# Patient Record
Sex: FEMALE | Race: ASIAN | NOT HISPANIC OR LATINO
[De-identification: names, ages, dates, MRNs, and addresses within clinical notes are randomized per-mention and may not be internally consistent; named-entity substitution may affect disease eponyms.]

---

## 2018-07-24 ENCOUNTER — APPOINTMENT (OUTPATIENT)
Dept: ULTRASOUND IMAGING | Facility: CLINIC | Age: 48
End: 2018-07-24

## 2018-07-24 ENCOUNTER — APPOINTMENT (OUTPATIENT)
Dept: MAMMOGRAPHY | Facility: CLINIC | Age: 48
End: 2018-07-24

## 2018-12-17 ENCOUNTER — APPOINTMENT (OUTPATIENT)
Dept: MRI IMAGING | Facility: IMAGING CENTER | Age: 48
End: 2018-12-17
Payer: COMMERCIAL

## 2018-12-17 ENCOUNTER — OUTPATIENT (OUTPATIENT)
Dept: OUTPATIENT SERVICES | Facility: HOSPITAL | Age: 48
LOS: 1 days | End: 2018-12-17
Payer: COMMERCIAL

## 2018-12-17 ENCOUNTER — EMERGENCY (EMERGENCY)
Facility: HOSPITAL | Age: 48
LOS: 1 days | Discharge: HOME | End: 2018-12-17
Attending: STUDENT IN AN ORGANIZED HEALTH CARE EDUCATION/TRAINING PROGRAM
Payer: COMMERCIAL

## 2018-12-17 VITALS
TEMPERATURE: 99 F | HEIGHT: 62 IN | DIASTOLIC BLOOD PRESSURE: 82 MMHG | SYSTOLIC BLOOD PRESSURE: 142 MMHG | OXYGEN SATURATION: 100 % | WEIGHT: 147.93 LBS | HEART RATE: 96 BPM | RESPIRATION RATE: 20 BRPM

## 2018-12-17 DIAGNOSIS — Z00.8 ENCOUNTER FOR OTHER GENERAL EXAMINATION: ICD-10-CM

## 2018-12-17 PROCEDURE — 99284 EMERGENCY DEPT VISIT MOD MDM: CPT | Mod: 25

## 2018-12-17 PROCEDURE — 70553 MRI BRAIN STEM W/O & W/DYE: CPT

## 2018-12-17 PROCEDURE — 82565 ASSAY OF CREATININE: CPT

## 2018-12-17 PROCEDURE — 70553 MRI BRAIN STEM W/O & W/DYE: CPT | Mod: 26

## 2018-12-17 PROCEDURE — A9585: CPT

## 2018-12-18 VITALS
SYSTOLIC BLOOD PRESSURE: 100 MMHG | RESPIRATION RATE: 18 BRPM | HEART RATE: 99 BPM | OXYGEN SATURATION: 100 % | DIASTOLIC BLOOD PRESSURE: 66 MMHG | TEMPERATURE: 100 F

## 2018-12-18 LAB
ANION GAP SERPL CALC-SCNC: 15 MMOL/L — SIGNIFICANT CHANGE UP (ref 5–17)
BASOPHILS # BLD AUTO: 0 K/UL — SIGNIFICANT CHANGE UP (ref 0–0.2)
BASOPHILS NFR BLD AUTO: 0 % — SIGNIFICANT CHANGE UP (ref 0–2)
BUN SERPL-MCNC: 7 MG/DL — SIGNIFICANT CHANGE UP (ref 7–23)
CALCIUM SERPL-MCNC: 9.1 MG/DL — SIGNIFICANT CHANGE UP (ref 8.4–10.5)
CHLORIDE SERPL-SCNC: 102 MMOL/L — SIGNIFICANT CHANGE UP (ref 96–108)
CO2 SERPL-SCNC: 22 MMOL/L — SIGNIFICANT CHANGE UP (ref 22–31)
CREAT SERPL-MCNC: 0.58 MG/DL — SIGNIFICANT CHANGE UP (ref 0.5–1.3)
EOSINOPHIL # BLD AUTO: 0.1 K/UL — SIGNIFICANT CHANGE UP (ref 0–0.5)
EOSINOPHIL NFR BLD AUTO: 0.9 % — SIGNIFICANT CHANGE UP (ref 0–6)
FLUAV H3 RNA SPEC QL NAA+PROBE: DETECTED
GAS PNL BLDV: SIGNIFICANT CHANGE UP
GLUCOSE SERPL-MCNC: 111 MG/DL — HIGH (ref 70–99)
HCT VFR BLD CALC: 36.1 % — SIGNIFICANT CHANGE UP (ref 34.5–45)
HGB BLD-MCNC: 12.7 G/DL — SIGNIFICANT CHANGE UP (ref 11.5–15.5)
LYMPHOCYTES # BLD AUTO: 0.6 K/UL — LOW (ref 1–3.3)
LYMPHOCYTES # BLD AUTO: 9 % — LOW (ref 13–44)
MAGNESIUM SERPL-MCNC: 1.6 MG/DL — SIGNIFICANT CHANGE UP (ref 1.6–2.6)
MCHC RBC-ENTMCNC: 30.4 PG — SIGNIFICANT CHANGE UP (ref 27–34)
MCHC RBC-ENTMCNC: 35.1 GM/DL — SIGNIFICANT CHANGE UP (ref 32–36)
MCV RBC AUTO: 86.6 FL — SIGNIFICANT CHANGE UP (ref 80–100)
MONOCYTES # BLD AUTO: 0.5 K/UL — SIGNIFICANT CHANGE UP (ref 0–0.9)
MONOCYTES NFR BLD AUTO: 8.5 % — SIGNIFICANT CHANGE UP (ref 2–14)
NEUTROPHILS # BLD AUTO: 5 K/UL — SIGNIFICANT CHANGE UP (ref 1.8–7.4)
NEUTROPHILS NFR BLD AUTO: 81.6 % — HIGH (ref 43–77)
PHOSPHATE SERPL-MCNC: 2.5 MG/DL — SIGNIFICANT CHANGE UP (ref 2.5–4.5)
PLATELET # BLD AUTO: 205 K/UL — SIGNIFICANT CHANGE UP (ref 150–400)
POTASSIUM SERPL-MCNC: 3.7 MMOL/L — SIGNIFICANT CHANGE UP (ref 3.5–5.3)
POTASSIUM SERPL-SCNC: 3.7 MMOL/L — SIGNIFICANT CHANGE UP (ref 3.5–5.3)
RAPID RVP RESULT: DETECTED
RBC # BLD: 4.17 M/UL — SIGNIFICANT CHANGE UP (ref 3.8–5.2)
RBC # FLD: 11.6 % — SIGNIFICANT CHANGE UP (ref 10.3–14.5)
SODIUM SERPL-SCNC: 139 MMOL/L — SIGNIFICANT CHANGE UP (ref 135–145)
WBC # BLD: 6.1 K/UL — SIGNIFICANT CHANGE UP (ref 3.8–10.5)
WBC # FLD AUTO: 6.1 K/UL — SIGNIFICANT CHANGE UP (ref 3.8–10.5)

## 2018-12-18 PROCEDURE — 85014 HEMATOCRIT: CPT

## 2018-12-18 PROCEDURE — 83735 ASSAY OF MAGNESIUM: CPT

## 2018-12-18 PROCEDURE — 99284 EMERGENCY DEPT VISIT MOD MDM: CPT | Mod: 25

## 2018-12-18 PROCEDURE — 80048 BASIC METABOLIC PNL TOTAL CA: CPT

## 2018-12-18 PROCEDURE — 87040 BLOOD CULTURE FOR BACTERIA: CPT

## 2018-12-18 PROCEDURE — 84100 ASSAY OF PHOSPHORUS: CPT

## 2018-12-18 PROCEDURE — 87581 M.PNEUMON DNA AMP PROBE: CPT

## 2018-12-18 PROCEDURE — 84295 ASSAY OF SERUM SODIUM: CPT

## 2018-12-18 PROCEDURE — 87798 DETECT AGENT NOS DNA AMP: CPT

## 2018-12-18 PROCEDURE — 87486 CHLMYD PNEUM DNA AMP PROBE: CPT

## 2018-12-18 PROCEDURE — 85027 COMPLETE CBC AUTOMATED: CPT

## 2018-12-18 PROCEDURE — 87633 RESP VIRUS 12-25 TARGETS: CPT

## 2018-12-18 PROCEDURE — 84132 ASSAY OF SERUM POTASSIUM: CPT

## 2018-12-18 PROCEDURE — 82803 BLOOD GASES ANY COMBINATION: CPT

## 2018-12-18 PROCEDURE — 71045 X-RAY EXAM CHEST 1 VIEW: CPT | Mod: 26

## 2018-12-18 PROCEDURE — 71045 X-RAY EXAM CHEST 1 VIEW: CPT

## 2018-12-18 PROCEDURE — 83605 ASSAY OF LACTIC ACID: CPT

## 2018-12-18 PROCEDURE — 82330 ASSAY OF CALCIUM: CPT

## 2018-12-18 PROCEDURE — 82947 ASSAY GLUCOSE BLOOD QUANT: CPT

## 2018-12-18 PROCEDURE — 82435 ASSAY OF BLOOD CHLORIDE: CPT

## 2018-12-18 RX ORDER — SODIUM CHLORIDE 9 MG/ML
1000 INJECTION INTRAMUSCULAR; INTRAVENOUS; SUBCUTANEOUS ONCE
Qty: 0 | Refills: 0 | Status: COMPLETED | OUTPATIENT
Start: 2018-12-18 | End: 2018-12-18

## 2018-12-18 RX ORDER — ACETAMINOPHEN 500 MG
650 TABLET ORAL ONCE
Qty: 0 | Refills: 0 | Status: COMPLETED | OUTPATIENT
Start: 2018-12-18 | End: 2018-12-18

## 2018-12-18 RX ADMIN — Medication 100 MILLIGRAM(S): at 03:12

## 2018-12-18 RX ADMIN — Medication 650 MILLIGRAM(S): at 03:12

## 2018-12-18 RX ADMIN — SODIUM CHLORIDE 1000 MILLILITER(S): 9 INJECTION INTRAMUSCULAR; INTRAVENOUS; SUBCUTANEOUS at 03:13

## 2018-12-18 NOTE — ED PROVIDER NOTE - PHYSICAL EXAMINATION
PHYSICAL EXAM:    GENERAL: Comfortable, no acute distress   HEAD:  Normocephalic, atraumatic  EYES: EOMI, PERRLA  HEENT: Moist mucous membranes  NECK: Supple, No JVD  NERVOUS SYSTEM:  Alert & Oriented X3, Motor Strength 5/5 B/L upper and lower extremities  CHEST/LUNG: decrease breath sounds bilaterally, no rales or rhonchi   HEART: tachycardic rate and regular rhythm, no murmur   ABDOMEN: Soft, Nontender, Nondistended, Bowel sounds present  EXTREMITIES:   No clubbing, cyanosis, or edema  MUSCULOSKELETAL: No muscle tenderness, no joint tenderness  SKIN:  warm and dry, no rash. blisters on upper back

## 2018-12-18 NOTE — ED ADULT NURSE NOTE - PSH
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
## 2018-12-18 NOTE — ED ADULT NURSE NOTE - OBJECTIVE STATEMENT
pt c/o "non-prod cough, fever 102, general body aches/chills x 2 days. Last took motrin 400mg at 1900. Felt this way aprox 2 weeks ago and lasted 5 days and now started but again"

## 2018-12-18 NOTE — ED PROVIDER NOTE - CHIEF COMPLAINT
The patient is a 48y Female complaining of The patient is a 48y Female complaining of fevers chills cough

## 2018-12-18 NOTE — ED PROVIDER NOTE - OBJECTIVE STATEMENT
49 yo woman pmh htn, hld presenting with 10 days of fevers, chills, cough. Pt reported ha 47 yo woman pmh htn, hld presenting with 10 days of fevers, chills, cough. Pt reported having cold like symptoms of cough, running nose for 10 days. Pt started feeling better until yesterday when she had fevers and chills. Fever measured 102. 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 Pt recently traveled to [place] last september. Pt initially had dry cough, but now has productive cough with brown sputum. Denies hemoptysis. Pt also endorses rhinorrhea, headache, joint aches (hips, small joints of hands and feet). Pt reports having flu shot. No abd pain, no diarrhea, no constipation, no dysuria. Of note, pt reported using heating pad for upper back and developed blisters.

## 2018-12-18 NOTE — ED ADULT NURSE NOTE - NSIMPLEMENTINTERV_GEN_ALL_ED
Implemented All Universal Safety Interventions:  Central to call system. Call bell, personal items and telephone within reach. Instruct patient to call for assistance. Room bathroom lighting operational. Non-slip footwear when patient is off stretcher. Physically safe environment: no spills, clutter or unnecessary equipment. Stretcher in lowest position, wheels locked, appropriate side rails in place.

## 2018-12-18 NOTE — ED PROVIDER NOTE - NSFOLLOWUPINSTRUCTIONS_ED_ALL_ED_FT
You have the flu. Please maintain good hand hygiene. Avoid contact with infants and the elderly. Please stay well hydrated and rest. You can take tamiflu as directed and take cough medicine as needed. You can take tylenol for fevers and aches. If you have shortness of breath, chest pain, or fevers lasting more than a week, please return to the ED.

## 2018-12-18 NOTE — ED PROVIDER NOTE - PROGRESS NOTE DETAILS
flu positive, cxr neg, no leukocytosis. Pt feels well. Pt feels ok for dc with cough meds and tamiflu

## 2018-12-18 NOTE — ED ADULT NURSE NOTE - CHPI ED NUR SYMPTOMS NEG
no abdominal pain/no diarrhea/no decreased eating/drinking/no headache/no vomiting/no rash/no shortness of breath

## 2018-12-18 NOTE — ED ADULT NURSE NOTE - PMH
GERD (Gastroesophageal Reflux Disease)    Hypothyroid    Miscarriage  X 3   (Normal Spontaneous Vaginal Delivery)    Placenta Previa  diagnosed  placed on bedrest, resolved

## 2018-12-23 LAB
CULTURE RESULTS: SIGNIFICANT CHANGE UP
CULTURE RESULTS: SIGNIFICANT CHANGE UP
SPECIMEN SOURCE: SIGNIFICANT CHANGE UP
SPECIMEN SOURCE: SIGNIFICANT CHANGE UP

## 2019-10-14 ENCOUNTER — EMERGENCY (EMERGENCY)
Facility: HOSPITAL | Age: 49
LOS: 1 days | Discharge: HOME | End: 2019-10-14
Attending: EMERGENCY MEDICINE
Payer: COMMERCIAL

## 2019-10-14 VITALS
TEMPERATURE: 98 F | SYSTOLIC BLOOD PRESSURE: 142 MMHG | DIASTOLIC BLOOD PRESSURE: 70 MMHG | HEART RATE: 70 BPM | OXYGEN SATURATION: 99 %

## 2019-10-14 VITALS
HEIGHT: 61 IN | RESPIRATION RATE: 16 BRPM | WEIGHT: 147.93 LBS | SYSTOLIC BLOOD PRESSURE: 146 MMHG | TEMPERATURE: 98 F | HEART RATE: 73 BPM | DIASTOLIC BLOOD PRESSURE: 72 MMHG | OXYGEN SATURATION: 100 %

## 2019-10-14 PROCEDURE — 73562 X-RAY EXAM OF KNEE 3: CPT | Mod: 26,LT

## 2019-10-14 PROCEDURE — 99284 EMERGENCY DEPT VISIT MOD MDM: CPT

## 2019-10-14 PROCEDURE — 99283 EMERGENCY DEPT VISIT LOW MDM: CPT

## 2019-10-14 PROCEDURE — 73562 X-RAY EXAM OF KNEE 3: CPT

## 2019-10-14 RX ORDER — ACETAMINOPHEN 500 MG
650 TABLET ORAL ONCE
Refills: 0 | Status: COMPLETED | OUTPATIENT
Start: 2019-10-14 | End: 2019-10-14

## 2019-10-14 RX ADMIN — Medication 650 MILLIGRAM(S): at 13:07

## 2019-10-14 RX ADMIN — Medication 650 MILLIGRAM(S): at 13:13

## 2019-10-14 NOTE — ED PROVIDER NOTE - ATTENDING CONTRIBUTION TO CARE
I performed a history and physical exam of the patient and discussed their management with the resident. I reviewed the resident's note and agree with the documented findings and plan of care.  Alexia Rossi MD

## 2019-10-14 NOTE — ED PROVIDER NOTE - PSH
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  Given the location of the defect, shape of the defect and the proximity to free margins a split thickness skin graft was deemed most appropriate.  Using a sterile surgical marker, the primary defect shape was transferred to the donor site. The split thickness graft was then harvested.  The skin graft was then placed in the primary defect and oriented appropriately.

## 2019-10-14 NOTE — ED PROVIDER NOTE - NS_ ATTENDINGSCRIBEDETAILS _ED_A_ED_FT
I performed a history and physical exam of the patient and discussed their management with the resident. I reviewed the scribe's note and agree with the documented findings and plan of care.  Alexia Rossi MD

## 2019-10-14 NOTE — ED PROVIDER NOTE - CARE PROVIDER_API CALL
Jayleen Kaur (MD)  Orthopaedic Surgery  [address] [place] [address], [address]  [place] [address]  Phone: [phone]  Fax: [phone]  Follow Up Time:

## 2019-10-14 NOTE — ED PROVIDER NOTE - PMH
Documentation of the ultasound findings, images, and interpretations with be available in the patient's Viewpoint report which is located in the imaging tab in chart review.  
Pt denies problems with pregnancy.  To see OB next.  Panorama-low risk, male.  
GERD (Gastroesophageal Reflux Disease)    Hypothyroid    Miscarriage  X 3   (Normal Spontaneous Vaginal Delivery)    Placenta Previa  diagnosed  placed on bedrest, resolved
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
## 2019-10-14 NOTE — ED PROVIDER NOTE - PHYSICAL EXAMINATION
General: No acute distress.  Heart: Regular rate and rhythm. No murmurs, rubs, or gallops.  Lungs: CTAB, no wheezes or rhonchi.  Abdomen: Soft, non-tender, non-distended. No organomegaly.  Eyes: PERRL, EOMI.  Neuro: AAOx4, no focal motor or sensory deficits. CN II-XII intact.  Extremities: No lower extremity swelling, 2+ DP and radial pulses.  Skin: No rashes or lesions. General: No acute distress.  Heart: Regular rate and rhythm. No murmurs, rubs, or gallops.  Lungs: CTAB, no wheezes or rhonchi.  Abdomen: Soft, non-tender, non-distended. No organomegaly.  Eyes: PERRL, EOMI.  Neuro: AAOx4, no focal motor or sensory deficits. CN II-XII intact.  Extremities: Left knee medial tenderness with decreased ROM but no swelling.  Skin: No rashes or lesions.  Back: Left-sided paraspinal lumbar tenderness, no point tenderness over vertebrae. No CVA tenderness.

## 2019-10-14 NOTE — ED PROVIDER NOTE - PATIENT PORTAL LINK FT
You can access the FollowMyHealth Patient Portal offered by [facility] by registering at the following website: [email or web address]. By joining [employer]’s FollowMyHealth portal, you will also be able to view your health information using other applications (apps) compatible with our system.
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
## 2019-10-14 NOTE — ED PROVIDER NOTE - NSFOLLOWUPCLINICS_GEN_ALL_ED_FT
[facility] Sports [facility]  Sports Medicine  [address] [place] [address]  Phone: [phone]  Fax:   Follow Up Time:
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
## 2020-12-04 ENCOUNTER — APPOINTMENT (OUTPATIENT)
Dept: ORTHOPEDIC SURGERY | Facility: CLINIC | Age: 50
End: 2020-12-04
Payer: OTHER MISCELLANEOUS

## 2020-12-04 VITALS — WEIGHT: 148 LBS | HEIGHT: 61 IN | BODY MASS INDEX: 27.94 KG/M2

## 2020-12-04 VITALS — DIASTOLIC BLOOD PRESSURE: 82 MMHG | SYSTOLIC BLOOD PRESSURE: 156 MMHG | HEART RATE: 84 BPM

## 2020-12-04 DIAGNOSIS — M54.2 CERVICALGIA: ICD-10-CM

## 2020-12-04 DIAGNOSIS — M54.16 RADICULOPATHY, LUMBAR REGION: ICD-10-CM

## 2020-12-04 DIAGNOSIS — G89.29 LUMBAGO WITH SCIATICA, RIGHT SIDE: ICD-10-CM

## 2020-12-04 DIAGNOSIS — M54.41 LUMBAGO WITH SCIATICA, RIGHT SIDE: ICD-10-CM
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
## 2020-12-04 DIAGNOSIS — Z86.39 PERSONAL HISTORY OF OTHER ENDOCRINE, NUTRITIONAL AND METABOLIC DISEASE: ICD-10-CM

## 2020-12-04 DIAGNOSIS — Z87.898 PERSONAL HISTORY OF OTHER SPECIFIED CONDITIONS: ICD-10-CM

## 2020-12-04 DIAGNOSIS — M54.12 RADICULOPATHY, CERVICAL REGION: ICD-10-CM

## 2020-12-04 PROCEDURE — 72040 X-RAY EXAM NECK SPINE 2-3 VW: CPT

## 2020-12-04 PROCEDURE — 72100 X-RAY EXAM L-S SPINE 2/3 VWS: CPT

## 2020-12-04 PROCEDURE — 99072 ADDL SUPL MATRL&STAF TM PHE: CPT

## 2020-12-04 PROCEDURE — 99204 OFFICE O/P NEW MOD 45 MIN: CPT

## 2020-12-04 RX ORDER — IBUPROFEN 800 MG/1
800 TABLET, FILM COATED ORAL
Qty: 90 | Refills: 0 | Status: ACTIVE | COMMUNITY
Start: 2020-12-04 | End: 1900-01-01
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
## 2020-12-04 NOTE — PHYSICAL EXAM
[de-identified] : She is fully alert and oriented with a normal mood and affect.  She is in no acute distress as I take the history.  She ambulates with a normal gait including tiptoe and heel walking.  Cutaneous examination of the spine reveals some midline blistering that she says is due to a heating pad at the lumbosacral junction.  There is no evidence of shortness of breath or respiratory distress.  There are no other cutaneous abnormalities or blisters that would suggest that this could be shingles.  There is no paravertebral muscle spasm, sciatic notch tenderness or trochanteric tenderness.  Forward flexion of the spine to 60 degrees causes lower back pain.  Her lower extremity neurological examination revealed 1-2+ symmetrical reflexes with downgoing toes.  Motor power was normal to manual testing in all lower extremity groups but there was some giving way weakness of the right quadriceps that required coaxing her to cooperate.  There is decreased sensation to light touch on the right side in an L5 and S1 nerve root distribution.  Straight leg raising was negative to 90 degrees on the left in the sitting position and on the right at 90 degrees because of lower back discomfort.  Vascular examination showed no evidence of varicosities and there is no lymphedema.  There are no cutaneous abnormalities of the upper or lower extremities.  There was some mild 5 - to 4+/5 weakness in a diffuse fashion in the right upper extremity.  This may have been due to pain.  Reflexes were trace in the upper extremities with reinforcement.  Range of motion of the cervical spine is restricted by pain. [de-identified] : Is no imaging studies or reports are available AP and lateral x-rays of the cervical and lumbar spine were obtained.  Lumbar spine x-rays reveal normal sagittal alignment.  There are no destructive changes.  Disc heights are well-maintained.  There are some changes of facet arthrosis.  There is a minimal scoliosis.  AP and lateral x-rays of the cervical spine reveal straightening of the normal cervical lordosis with some very minimal disc space narrowing at C5-6.  There are no destructive changes.

## 2020-12-04 NOTE — CONSULT LETTER
[Dear  ___] : Dear  [unfilled], [Please see my note below.] : Please see my note below. [Sincerely,] : Sincerely, [FreeTextEntry1] : Thank you for referring this woman for evaluation of her ongoing spine related symptoms.

## 2020-12-04 NOTE — DISCUSSION/SUMMARY
[Medication Risks Reviewed] : Medication risks reviewed [de-identified] : She will rest and use moist heat.  She has been started on ibuprofen 800 mg 3 times a day as a nonsteroidal anti-inflammatory.  She will be seeing her neurologist early next week.  She may need another MRI of the lumbar spine and they are currently awaiting approval for another MRI of the cervical spine.  She will call if there are problems with the medication or worsening of her symptoms and I will see her for follow-up in 3 weeks.

## 2020-12-04 NOTE — REASON FOR VISIT
[Initial Visit] : an initial visit for [Worker's Compensation] : this visit is related to worker's compensation [Back Pain] : back pain [Neck Pain] : neck pain [Radiculopathy] : radiculopathy

## 2020-12-10 ENCOUNTER — APPOINTMENT (OUTPATIENT)
Dept: PLASTIC SURGERY | Facility: CLINIC | Age: 50
End: 2020-12-10
Payer: SELF-PAY

## 2020-12-10 VITALS — HEIGHT: 61 IN | WEIGHT: 148 LBS | BODY MASS INDEX: 27.94 KG/M2

## 2020-12-10 PROCEDURE — 99203 OFFICE O/P NEW LOW 30 MIN: CPT

## 2020-12-10 PROCEDURE — 99499Q: CUSTOM | Mod: NC

## 2020-12-13 NOTE — HISTORY OF PRESENT ILLNESS
[FreeTextEntry1] : 50 years old Patient presents to the office for forehead wrinkles and nasolabial fold area.\zainab Pt never had any injection or facial surgery.\zainab Marshall is here to discuss w/MD.

## 2020-12-22 ENCOUNTER — APPOINTMENT (OUTPATIENT)
Dept: PLASTIC SURGERY | Facility: CLINIC | Age: 50
End: 2020-12-22
Payer: SELF-PAY

## 2020-12-22 PROCEDURE — G0429: CPT

## 2020-12-28 ENCOUNTER — APPOINTMENT (OUTPATIENT)
Dept: ORTHOPEDIC SURGERY | Facility: CLINIC | Age: 50
End: 2020-12-28

## 2021-01-05 ENCOUNTER — APPOINTMENT (OUTPATIENT)
Dept: PLASTIC SURGERY | Facility: CLINIC | Age: 51
End: 2021-01-05
Payer: SELF-PAY

## 2021-01-05 DIAGNOSIS — Z09 ENCOUNTER FOR FOLLOW-UP EXAMINATION AFTER COMPLETED TREATMENT FOR CONDITIONS OTHER THAN MALIGNANT NEOPLASM: ICD-10-CM

## 2021-01-05 PROCEDURE — 99499Q: CUSTOM | Mod: NC

## 2021-01-05 NOTE — HISTORY OF PRESENT ILLNESS
[FreeTextEntry1] : Patient is being seen for follow up Botox injection and Fillers on 12/22/20.  Devi is pleased with her results but \par reports burning sensation and discomfort in her left cheek. She states the onset began 2-3 days after the injection and it has gotten better with time but still persists.  She denies any redness, wounds, or skin discoloration.\par

## 2021-01-05 NOTE — PHYSICAL EXAM
[de-identified] : alert, calm, cooperative\par  [de-identified] : respirations are even and unlabored\par  [de-identified] : the Botox has settled in well.  Brows are symmetric, forehead smooth.  Bilateral nasolabial folds are with mild edema.  Small area of palpable filler appreciated at the left nasolabial fold. No signs of skin necrosis.  Capillary refill is good, skin is pink.

## 2022-02-24 ENCOUNTER — APPOINTMENT (OUTPATIENT)
Dept: PLASTIC SURGERY | Facility: CLINIC | Age: 52
End: 2022-02-24
Payer: SELF-PAY

## 2022-02-24 NOTE — HISTORY OF PRESENT ILLNESS
[FreeTextEntry1] : 51-year-old female with facial aging presents for Botox injections. Patient reports being happy with the results from last treatment with Botox.

## 2022-02-24 NOTE — PHYSICAL EXAM
[de-identified] : Well-appearing, NAD. [de-identified] : Normocephalic, atraumatic. [de-identified] : C/D/I.

## 2022-07-28 ENCOUNTER — APPOINTMENT (OUTPATIENT)
Dept: PLASTIC SURGERY | Facility: CLINIC | Age: 52
End: 2022-07-28

## 2022-12-13 ENCOUNTER — APPOINTMENT (OUTPATIENT)
Dept: PLASTIC SURGERY | Facility: CLINIC | Age: 52
End: 2022-12-13

## 2023-05-18 ENCOUNTER — APPOINTMENT (OUTPATIENT)
Dept: PLASTIC SURGERY | Facility: CLINIC | Age: 53
End: 2023-05-18
Payer: SELF-PAY

## 2023-10-12 ENCOUNTER — APPOINTMENT (OUTPATIENT)
Dept: PLASTIC SURGERY | Facility: CLINIC | Age: 53
End: 2023-10-12

## 2023-10-19 ENCOUNTER — APPOINTMENT (OUTPATIENT)
Dept: PLASTIC SURGERY | Facility: CLINIC | Age: 53
End: 2023-10-19
Payer: SELF-PAY

## 2023-11-30 ENCOUNTER — INPATIENT (INPATIENT)
Facility: HOSPITAL | Age: 53
LOS: 0 days | Discharge: HOME | End: 2023-12-01
Attending: INTERNAL MEDICINE | Admitting: INTERNAL MEDICINE
Payer: COMMERCIAL

## 2023-11-30 VITALS
HEART RATE: 72 BPM | DIASTOLIC BLOOD PRESSURE: 106 MMHG | RESPIRATION RATE: 17 BRPM | OXYGEN SATURATION: 100 % | TEMPERATURE: 98 F | SYSTOLIC BLOOD PRESSURE: 215 MMHG

## 2023-11-30 DIAGNOSIS — R07.9 CHEST PAIN, UNSPECIFIED: ICD-10-CM

## 2023-11-30 LAB
ALBUMIN SERPL ELPH-MCNC: 4.5 G/DL — SIGNIFICANT CHANGE UP (ref 3.3–5)
ALBUMIN SERPL ELPH-MCNC: 4.5 G/DL — SIGNIFICANT CHANGE UP (ref 3.3–5)
ALP SERPL-CCNC: 64 U/L — SIGNIFICANT CHANGE UP (ref 40–120)
ALP SERPL-CCNC: 64 U/L — SIGNIFICANT CHANGE UP (ref 40–120)
ALT FLD-CCNC: 14 U/L — SIGNIFICANT CHANGE UP (ref 4–33)
ALT FLD-CCNC: 14 U/L — SIGNIFICANT CHANGE UP (ref 4–33)
ANION GAP SERPL CALC-SCNC: 7 MMOL/L — SIGNIFICANT CHANGE UP (ref 7–14)
ANION GAP SERPL CALC-SCNC: 7 MMOL/L — SIGNIFICANT CHANGE UP (ref 7–14)
AST SERPL-CCNC: 12 U/L — SIGNIFICANT CHANGE UP (ref 4–32)
AST SERPL-CCNC: 12 U/L — SIGNIFICANT CHANGE UP (ref 4–32)
BASOPHILS # BLD AUTO: 0.02 K/UL — SIGNIFICANT CHANGE UP (ref 0–0.2)
BASOPHILS # BLD AUTO: 0.02 K/UL — SIGNIFICANT CHANGE UP (ref 0–0.2)
BASOPHILS NFR BLD AUTO: 0.4 % — SIGNIFICANT CHANGE UP (ref 0–2)
BASOPHILS NFR BLD AUTO: 0.4 % — SIGNIFICANT CHANGE UP (ref 0–2)
BILIRUB SERPL-MCNC: 0.6 MG/DL — SIGNIFICANT CHANGE UP (ref 0.2–1.2)
BILIRUB SERPL-MCNC: 0.6 MG/DL — SIGNIFICANT CHANGE UP (ref 0.2–1.2)
BUN SERPL-MCNC: 15 MG/DL — SIGNIFICANT CHANGE UP (ref 7–23)
BUN SERPL-MCNC: 15 MG/DL — SIGNIFICANT CHANGE UP (ref 7–23)
CALCIUM SERPL-MCNC: 9.7 MG/DL — SIGNIFICANT CHANGE UP (ref 8.4–10.5)
CALCIUM SERPL-MCNC: 9.7 MG/DL — SIGNIFICANT CHANGE UP (ref 8.4–10.5)
CHLORIDE SERPL-SCNC: 105 MMOL/L — SIGNIFICANT CHANGE UP (ref 98–107)
CHLORIDE SERPL-SCNC: 105 MMOL/L — SIGNIFICANT CHANGE UP (ref 98–107)
CO2 SERPL-SCNC: 28 MMOL/L — SIGNIFICANT CHANGE UP (ref 22–31)
CO2 SERPL-SCNC: 28 MMOL/L — SIGNIFICANT CHANGE UP (ref 22–31)
CREAT SERPL-MCNC: 0.6 MG/DL — SIGNIFICANT CHANGE UP (ref 0.5–1.3)
CREAT SERPL-MCNC: 0.6 MG/DL — SIGNIFICANT CHANGE UP (ref 0.5–1.3)
EGFR: 107 ML/MIN/1.73M2 — SIGNIFICANT CHANGE UP
EGFR: 107 ML/MIN/1.73M2 — SIGNIFICANT CHANGE UP
EOSINOPHIL # BLD AUTO: 0.05 K/UL — SIGNIFICANT CHANGE UP (ref 0–0.5)
EOSINOPHIL # BLD AUTO: 0.05 K/UL — SIGNIFICANT CHANGE UP (ref 0–0.5)
EOSINOPHIL NFR BLD AUTO: 1 % — SIGNIFICANT CHANGE UP (ref 0–6)
EOSINOPHIL NFR BLD AUTO: 1 % — SIGNIFICANT CHANGE UP (ref 0–6)
GLUCOSE SERPL-MCNC: 106 MG/DL — HIGH (ref 70–99)
GLUCOSE SERPL-MCNC: 106 MG/DL — HIGH (ref 70–99)
HCT VFR BLD CALC: 40 % — SIGNIFICANT CHANGE UP (ref 34.5–45)
HCT VFR BLD CALC: 40 % — SIGNIFICANT CHANGE UP (ref 34.5–45)
HGB BLD-MCNC: 13.6 G/DL — SIGNIFICANT CHANGE UP (ref 11.5–15.5)
HGB BLD-MCNC: 13.6 G/DL — SIGNIFICANT CHANGE UP (ref 11.5–15.5)
IANC: 2.64 K/UL — SIGNIFICANT CHANGE UP (ref 1.8–7.4)
IANC: 2.64 K/UL — SIGNIFICANT CHANGE UP (ref 1.8–7.4)
IMM GRANULOCYTES NFR BLD AUTO: 0.2 % — SIGNIFICANT CHANGE UP (ref 0–0.9)
IMM GRANULOCYTES NFR BLD AUTO: 0.2 % — SIGNIFICANT CHANGE UP (ref 0–0.9)
LYMPHOCYTES # BLD AUTO: 1.81 K/UL — SIGNIFICANT CHANGE UP (ref 1–3.3)
LYMPHOCYTES # BLD AUTO: 1.81 K/UL — SIGNIFICANT CHANGE UP (ref 1–3.3)
LYMPHOCYTES # BLD AUTO: 36.6 % — SIGNIFICANT CHANGE UP (ref 13–44)
LYMPHOCYTES # BLD AUTO: 36.6 % — SIGNIFICANT CHANGE UP (ref 13–44)
MCHC RBC-ENTMCNC: 28.8 PG — SIGNIFICANT CHANGE UP (ref 27–34)
MCHC RBC-ENTMCNC: 28.8 PG — SIGNIFICANT CHANGE UP (ref 27–34)
MCHC RBC-ENTMCNC: 34 GM/DL — SIGNIFICANT CHANGE UP (ref 32–36)
MCHC RBC-ENTMCNC: 34 GM/DL — SIGNIFICANT CHANGE UP (ref 32–36)
MCV RBC AUTO: 84.7 FL — SIGNIFICANT CHANGE UP (ref 80–100)
MCV RBC AUTO: 84.7 FL — SIGNIFICANT CHANGE UP (ref 80–100)
MONOCYTES # BLD AUTO: 0.42 K/UL — SIGNIFICANT CHANGE UP (ref 0–0.9)
MONOCYTES # BLD AUTO: 0.42 K/UL — SIGNIFICANT CHANGE UP (ref 0–0.9)
MONOCYTES NFR BLD AUTO: 8.5 % — SIGNIFICANT CHANGE UP (ref 2–14)
MONOCYTES NFR BLD AUTO: 8.5 % — SIGNIFICANT CHANGE UP (ref 2–14)
NEUTROPHILS # BLD AUTO: 2.64 K/UL — SIGNIFICANT CHANGE UP (ref 1.8–7.4)
NEUTROPHILS # BLD AUTO: 2.64 K/UL — SIGNIFICANT CHANGE UP (ref 1.8–7.4)
NEUTROPHILS NFR BLD AUTO: 53.3 % — SIGNIFICANT CHANGE UP (ref 43–77)
NEUTROPHILS NFR BLD AUTO: 53.3 % — SIGNIFICANT CHANGE UP (ref 43–77)
NRBC # BLD: 0 /100 WBCS — SIGNIFICANT CHANGE UP (ref 0–0)
NRBC # BLD: 0 /100 WBCS — SIGNIFICANT CHANGE UP (ref 0–0)
NRBC # FLD: 0 K/UL — SIGNIFICANT CHANGE UP (ref 0–0)
NRBC # FLD: 0 K/UL — SIGNIFICANT CHANGE UP (ref 0–0)
PLATELET # BLD AUTO: 243 K/UL — SIGNIFICANT CHANGE UP (ref 150–400)
PLATELET # BLD AUTO: 243 K/UL — SIGNIFICANT CHANGE UP (ref 150–400)
POTASSIUM SERPL-MCNC: 4.4 MMOL/L — SIGNIFICANT CHANGE UP (ref 3.5–5.3)
POTASSIUM SERPL-MCNC: 4.4 MMOL/L — SIGNIFICANT CHANGE UP (ref 3.5–5.3)
POTASSIUM SERPL-SCNC: 4.4 MMOL/L — SIGNIFICANT CHANGE UP (ref 3.5–5.3)
POTASSIUM SERPL-SCNC: 4.4 MMOL/L — SIGNIFICANT CHANGE UP (ref 3.5–5.3)
PROT SERPL-MCNC: 7.5 G/DL — SIGNIFICANT CHANGE UP (ref 6–8.3)
PROT SERPL-MCNC: 7.5 G/DL — SIGNIFICANT CHANGE UP (ref 6–8.3)
RBC # BLD: 4.72 M/UL — SIGNIFICANT CHANGE UP (ref 3.8–5.2)
RBC # BLD: 4.72 M/UL — SIGNIFICANT CHANGE UP (ref 3.8–5.2)
RBC # FLD: 12.5 % — SIGNIFICANT CHANGE UP (ref 10.3–14.5)
RBC # FLD: 12.5 % — SIGNIFICANT CHANGE UP (ref 10.3–14.5)
SODIUM SERPL-SCNC: 140 MMOL/L — SIGNIFICANT CHANGE UP (ref 135–145)
SODIUM SERPL-SCNC: 140 MMOL/L — SIGNIFICANT CHANGE UP (ref 135–145)
TROPONIN T, HIGH SENSITIVITY RESULT: <6 NG/L — SIGNIFICANT CHANGE UP
TSH SERPL-MCNC: 2.91 UIU/ML — SIGNIFICANT CHANGE UP (ref 0.27–4.2)
TSH SERPL-MCNC: 2.91 UIU/ML — SIGNIFICANT CHANGE UP (ref 0.27–4.2)
WBC # BLD: 4.95 K/UL — SIGNIFICANT CHANGE UP (ref 3.8–10.5)
WBC # BLD: 4.95 K/UL — SIGNIFICANT CHANGE UP (ref 3.8–10.5)
WBC # FLD AUTO: 4.95 K/UL — SIGNIFICANT CHANGE UP (ref 3.8–10.5)
WBC # FLD AUTO: 4.95 K/UL — SIGNIFICANT CHANGE UP (ref 3.8–10.5)

## 2023-11-30 PROCEDURE — 71045 X-RAY EXAM CHEST 1 VIEW: CPT | Mod: 26

## 2023-11-30 PROCEDURE — 99285 EMERGENCY DEPT VISIT HI MDM: CPT

## 2023-11-30 RX ORDER — LABETALOL HCL 100 MG
10 TABLET ORAL ONCE
Refills: 0 | Status: COMPLETED | OUTPATIENT
Start: 2023-11-30 | End: 2023-11-30

## 2023-11-30 RX ORDER — ASPIRIN/CALCIUM CARB/MAGNESIUM 324 MG
81 TABLET ORAL DAILY
Refills: 0 | Status: DISCONTINUED | OUTPATIENT
Start: 2023-11-30 | End: 2023-12-01

## 2023-11-30 RX ORDER — ATORVASTATIN CALCIUM 80 MG/1
20 TABLET, FILM COATED ORAL AT BEDTIME
Refills: 0 | Status: DISCONTINUED | OUTPATIENT
Start: 2023-11-30 | End: 2023-12-01

## 2023-11-30 RX ORDER — ACETAMINOPHEN 500 MG
975 TABLET ORAL ONCE
Refills: 0 | Status: COMPLETED | OUTPATIENT
Start: 2023-11-30 | End: 2023-11-30

## 2023-11-30 RX ORDER — LISINOPRIL 2.5 MG/1
5 TABLET ORAL DAILY
Refills: 0 | Status: DISCONTINUED | OUTPATIENT
Start: 2023-11-30 | End: 2023-12-01

## 2023-11-30 RX ADMIN — Medication 10 MILLIGRAM(S): at 11:50
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
## 2023-11-30 NOTE — ED ADULT NURSE NOTE - NSFALLUNIVINTERV_ED_ALL_ED
Bed/Stretcher in lowest position, wheels locked, appropriate side rails in place/Call bell, personal items and telephone in reach/Instruct patient to call for assistance before getting out of bed/chair/stretcher/Non-slip footwear applied when patient is off stretcher/Roy to call system/Physically safe environment - no spills, clutter or unnecessary equipment/Purposeful proactive rounding/Room/bathroom lighting operational, light cord in reach

## 2023-11-30 NOTE — PATIENT PROFILE ADULT - FALL HARM RISK - RISK INTERVENTIONS

## 2023-11-30 NOTE — ED PROVIDER NOTE - CLINICAL SUMMARY MEDICAL DECISION MAKING FREE TEXT BOX
The patient is a 49-year-old female with no past medical history who presents with an episode of chest pain, and persistent dizziness headache/neck pain.  Concern for hypertension urgency versus hypertension emergency versus ACS.  Plan for CBC, CMP, Trope x 2, chest x-ray. The patient is a 49-year-old female with no past medical history who presents with an episode of chest pain, and persistent dizziness headache/neck pain.  Concern for hypertension urgency versus hypertension emergency versus ACS.  Plan for CBC, CMP, Trope x 2, chest x-ray. Plan to treat pain with Tylenol.  Plan for revital after appropriate pain control.  Will be an admission for cardiac workup.

## 2023-11-30 NOTE — ED ADULT TRIAGE NOTE - CHIEF COMPLAINT QUOTE
Pt c/o chest discomfort and mild dizziness upon waking at 3am this morning that prompted her to take blood pressure. Pt states pressure was 200/100 at home. Pt went to urgent care this morning and had similar reading. Pt hypertensive in triage. Denies history. Dr Pena called for evaluation, no code stroke. Denies Phx

## 2023-11-30 NOTE — ED ADULT TRIAGE NOTE - BP NONINVASIVE DIASTOLIC (MM HG)
[FreeTextEntry1] : Ms. EZEQUIEL WRIGHT is a 66 year old female here for follow up for right mid back pain radiating to her abdomen. Since last visit, she had surgery for hernia repair, BRYSON with improvement of her right abdomen pain. Currently reports right sided low back pain. She had prior MRI, found to have right T11/12 neuroforaminal narrowing with worsening radicular, here for consultation for repeat BRYSON.\par \par Location: right sided mid back and low back pain\par Duration: started > 1 year ago, improved with BRYSON\par Inciting Event/Context: no specific inciting event or trauma\par Onset/Timing: constant\par Quality: sharp\par Severity: 7-8/10\par Exacerbating factors: worse with activities\par Relieving factors: gabapentin\par Radiation: intermittently into the right abdomen in T10, 11 distribution\par Numbness/Tingling: in distribution of radiation at times\par Bowel/Bladder neurological incontinence: denies\par lower ext. weakness: denies\par \par Prior Treatments:\par Physical Therapy: PT without benefit\par Injections: BRSYON with several months of relief\par Surgeries: gabapentin\par Imaging: MRI thoracic spine with T11/ 12 neuroforaminal narrowing
106

## 2023-11-30 NOTE — ED PROVIDER NOTE - NSICDXPASTMEDICALHX_GEN_ALL_CORE_FT
PAST MEDICAL HISTORY:  GERD (Gastroesophageal Reflux Disease)     Hypothyroid     Miscarriage X 3     (Normal Spontaneous Vaginal Delivery)     Placenta Previa diagnosed  placed on bedrest, resolved

## 2023-11-30 NOTE — ED PROVIDER NOTE - PHYSICAL EXAMINATION
Physical Exam:  Gen: NAD, non-toxic appearing  Head: NCAT  HEENT: Normal conjunctiva, trachea midline, moist mucous membranes  Lung: CTAB, no respiratory distress, no wheezes/rhonchi/rales B/L, speaking in full sentences  CV: RRR, no murmurs, rubs or gallops  Abd: Soft, NT, ND, no guarding, rigidity, rebound tenderness, or CVA tenderness   MSK: No visible deformities, moves all four extremities   Neuro: No focal motor deficits  Skin: Warm, well perfused, no visible rashes, no leg swelling  Psych: appropriate affect and mood

## 2023-11-30 NOTE — ED PROVIDER NOTE - OBJECTIVE STATEMENT
The patient is a 49-year-old female with no past medical history who presents with an episode of chest pain, and persistent dizziness headache/neck pain.  Reports that she had an episode of left sided chest pain that radiates to her scapula and jaw around 3:30 AM today.  She endorses that the episode lasted 1 to 2 minutes and resolved spontaneously.  She denied any shortness of breath or diaphoresis with the episode.  Since then has not had any chest pain.  She reports that she has a history of hypertension first documented about 3 years ago, she did a trial of a p.o. medication but after she checked her pressures at home she stopped taking her medication.  She has not seen a doctor in the past 2 years.  She currently denies any chest pain, shortness of breath, nausea, vomiting, changes in vision, abdominal pain.  She currently only endorses dizziness/headache/neck pain.

## 2023-11-30 NOTE — H&P ADULT - ASSESSMENT
49 F with no medical problems p/w Hypertensive urgency     Hypertensive Urgency CE x 2 negative     Start  patient  on Lisinopril   Cards eval   Follow up Echo

## 2023-11-30 NOTE — ED ADULT NURSE NOTE - OBJECTIVE STATEMENT
Pt c/o chest discomfort and mild dizziness upon waking at 3am this morning that prompted her to take blood pressure. Pt states pressure was 200/100 at home. Pt went to urgent care this morning and had similar reading. Pt hypertensive in triage. Denies history. Dr Pena called for evaluation, no code stroke. Denies Phx. Patient A&OX4. No distress noted. Breathing non-labored. Cardiac monitor in place. Labs sent. Right 20G IVL in place and tolerating well. Plan of care in progress.

## 2023-11-30 NOTE — H&P ADULT - HISTORY OF PRESENT ILLNESS
49-year-old female with no past medical history who presents with an episode of chest pain, and persistent dizziness headache/neck pain.    Patient reports that she had an episode of left sided chest pain that radiates to her scapula and jaw around 3:30 AM today, lasted 1 to 2 minutes and resolved spontaneously.  She denied any shortness of breath or diaphoresis with the episode.  Since then has not had any chest pain.  She reports that she has a history of hypertension first documented about 3 years ago, she did a trial of a p.o. medication but after she checked her pressures at home she stopped taking her medication.  She has not seen a doctor in the past 2 years.  She currently denies any chest pain, shortness of breath, nausea, vomiting, changes in vision, abdominal pain.  She currently only endorses dizziness/headache/neck pain.

## 2023-12-01 ENCOUNTER — TRANSCRIPTION ENCOUNTER (OUTPATIENT)
Age: 53
End: 2023-12-01

## 2023-12-01 VITALS
TEMPERATURE: 98 F | RESPIRATION RATE: 16 BRPM | OXYGEN SATURATION: 100 % | DIASTOLIC BLOOD PRESSURE: 78 MMHG | HEART RATE: 76 BPM | SYSTOLIC BLOOD PRESSURE: 155 MMHG

## 2023-12-01 LAB
ANION GAP SERPL CALC-SCNC: 11 MMOL/L — SIGNIFICANT CHANGE UP (ref 7–14)
ANION GAP SERPL CALC-SCNC: 11 MMOL/L — SIGNIFICANT CHANGE UP (ref 7–14)
BASOPHILS # BLD AUTO: 0.03 K/UL — SIGNIFICANT CHANGE UP (ref 0–0.2)
BASOPHILS # BLD AUTO: 0.03 K/UL — SIGNIFICANT CHANGE UP (ref 0–0.2)
BASOPHILS NFR BLD AUTO: 0.5 % — SIGNIFICANT CHANGE UP (ref 0–2)
BASOPHILS NFR BLD AUTO: 0.5 % — SIGNIFICANT CHANGE UP (ref 0–2)
BUN SERPL-MCNC: 10 MG/DL — SIGNIFICANT CHANGE UP (ref 7–23)
BUN SERPL-MCNC: 10 MG/DL — SIGNIFICANT CHANGE UP (ref 7–23)
CALCIUM SERPL-MCNC: 9.6 MG/DL — SIGNIFICANT CHANGE UP (ref 8.4–10.5)
CALCIUM SERPL-MCNC: 9.6 MG/DL — SIGNIFICANT CHANGE UP (ref 8.4–10.5)
CHLORIDE SERPL-SCNC: 104 MMOL/L — SIGNIFICANT CHANGE UP (ref 98–107)
CHLORIDE SERPL-SCNC: 104 MMOL/L — SIGNIFICANT CHANGE UP (ref 98–107)
CHOLEST SERPL-MCNC: 242 MG/DL — HIGH
CHOLEST SERPL-MCNC: 242 MG/DL — HIGH
CO2 SERPL-SCNC: 24 MMOL/L — SIGNIFICANT CHANGE UP (ref 22–31)
CO2 SERPL-SCNC: 24 MMOL/L — SIGNIFICANT CHANGE UP (ref 22–31)
CREAT SERPL-MCNC: 0.53 MG/DL — SIGNIFICANT CHANGE UP (ref 0.5–1.3)
CREAT SERPL-MCNC: 0.53 MG/DL — SIGNIFICANT CHANGE UP (ref 0.5–1.3)
EGFR: 110 ML/MIN/1.73M2 — SIGNIFICANT CHANGE UP
EGFR: 110 ML/MIN/1.73M2 — SIGNIFICANT CHANGE UP
EOSINOPHIL # BLD AUTO: 0.1 K/UL — SIGNIFICANT CHANGE UP (ref 0–0.5)
EOSINOPHIL # BLD AUTO: 0.1 K/UL — SIGNIFICANT CHANGE UP (ref 0–0.5)
EOSINOPHIL NFR BLD AUTO: 1.7 % — SIGNIFICANT CHANGE UP (ref 0–6)
EOSINOPHIL NFR BLD AUTO: 1.7 % — SIGNIFICANT CHANGE UP (ref 0–6)
GLUCOSE SERPL-MCNC: 99 MG/DL — SIGNIFICANT CHANGE UP (ref 70–99)
GLUCOSE SERPL-MCNC: 99 MG/DL — SIGNIFICANT CHANGE UP (ref 70–99)
HCT VFR BLD CALC: 38.9 % — SIGNIFICANT CHANGE UP (ref 34.5–45)
HCT VFR BLD CALC: 38.9 % — SIGNIFICANT CHANGE UP (ref 34.5–45)
HDLC SERPL-MCNC: 47 MG/DL — LOW
HDLC SERPL-MCNC: 47 MG/DL — LOW
HGB BLD-MCNC: 13.1 G/DL — SIGNIFICANT CHANGE UP (ref 11.5–15.5)
HGB BLD-MCNC: 13.1 G/DL — SIGNIFICANT CHANGE UP (ref 11.5–15.5)
IANC: 2.76 K/UL — SIGNIFICANT CHANGE UP (ref 1.8–7.4)
IANC: 2.76 K/UL — SIGNIFICANT CHANGE UP (ref 1.8–7.4)
IMM GRANULOCYTES NFR BLD AUTO: 0.2 % — SIGNIFICANT CHANGE UP (ref 0–0.9)
IMM GRANULOCYTES NFR BLD AUTO: 0.2 % — SIGNIFICANT CHANGE UP (ref 0–0.9)
LIPID PNL WITH DIRECT LDL SERPL: 167 MG/DL — HIGH
LIPID PNL WITH DIRECT LDL SERPL: 167 MG/DL — HIGH
LYMPHOCYTES # BLD AUTO: 2.47 K/UL — SIGNIFICANT CHANGE UP (ref 1–3.3)
LYMPHOCYTES # BLD AUTO: 2.47 K/UL — SIGNIFICANT CHANGE UP (ref 1–3.3)
LYMPHOCYTES # BLD AUTO: 42.4 % — SIGNIFICANT CHANGE UP (ref 13–44)
LYMPHOCYTES # BLD AUTO: 42.4 % — SIGNIFICANT CHANGE UP (ref 13–44)
MAGNESIUM SERPL-MCNC: 2 MG/DL — SIGNIFICANT CHANGE UP (ref 1.6–2.6)
MAGNESIUM SERPL-MCNC: 2 MG/DL — SIGNIFICANT CHANGE UP (ref 1.6–2.6)
MCHC RBC-ENTMCNC: 28.4 PG — SIGNIFICANT CHANGE UP (ref 27–34)
MCHC RBC-ENTMCNC: 28.4 PG — SIGNIFICANT CHANGE UP (ref 27–34)
MCHC RBC-ENTMCNC: 33.7 GM/DL — SIGNIFICANT CHANGE UP (ref 32–36)
MCHC RBC-ENTMCNC: 33.7 GM/DL — SIGNIFICANT CHANGE UP (ref 32–36)
MCV RBC AUTO: 84.4 FL — SIGNIFICANT CHANGE UP (ref 80–100)
MCV RBC AUTO: 84.4 FL — SIGNIFICANT CHANGE UP (ref 80–100)
MONOCYTES # BLD AUTO: 0.46 K/UL — SIGNIFICANT CHANGE UP (ref 0–0.9)
MONOCYTES # BLD AUTO: 0.46 K/UL — SIGNIFICANT CHANGE UP (ref 0–0.9)
MONOCYTES NFR BLD AUTO: 7.9 % — SIGNIFICANT CHANGE UP (ref 2–14)
MONOCYTES NFR BLD AUTO: 7.9 % — SIGNIFICANT CHANGE UP (ref 2–14)
NEUTROPHILS # BLD AUTO: 2.76 K/UL — SIGNIFICANT CHANGE UP (ref 1.8–7.4)
NEUTROPHILS # BLD AUTO: 2.76 K/UL — SIGNIFICANT CHANGE UP (ref 1.8–7.4)
NEUTROPHILS NFR BLD AUTO: 47.3 % — SIGNIFICANT CHANGE UP (ref 43–77)
NEUTROPHILS NFR BLD AUTO: 47.3 % — SIGNIFICANT CHANGE UP (ref 43–77)
NON HDL CHOLESTEROL: 195 MG/DL — HIGH
NON HDL CHOLESTEROL: 195 MG/DL — HIGH
NRBC # BLD: 0 /100 WBCS — SIGNIFICANT CHANGE UP (ref 0–0)
NRBC # BLD: 0 /100 WBCS — SIGNIFICANT CHANGE UP (ref 0–0)
NRBC # FLD: 0 K/UL — SIGNIFICANT CHANGE UP (ref 0–0)
NRBC # FLD: 0 K/UL — SIGNIFICANT CHANGE UP (ref 0–0)
PHOSPHATE SERPL-MCNC: 3.6 MG/DL — SIGNIFICANT CHANGE UP (ref 2.5–4.5)
PHOSPHATE SERPL-MCNC: 3.6 MG/DL — SIGNIFICANT CHANGE UP (ref 2.5–4.5)
PLATELET # BLD AUTO: 236 K/UL — SIGNIFICANT CHANGE UP (ref 150–400)
PLATELET # BLD AUTO: 236 K/UL — SIGNIFICANT CHANGE UP (ref 150–400)
POTASSIUM SERPL-MCNC: 3.6 MMOL/L — SIGNIFICANT CHANGE UP (ref 3.5–5.3)
POTASSIUM SERPL-MCNC: 3.6 MMOL/L — SIGNIFICANT CHANGE UP (ref 3.5–5.3)
POTASSIUM SERPL-SCNC: 3.6 MMOL/L — SIGNIFICANT CHANGE UP (ref 3.5–5.3)
POTASSIUM SERPL-SCNC: 3.6 MMOL/L — SIGNIFICANT CHANGE UP (ref 3.5–5.3)
RBC # BLD: 4.61 M/UL — SIGNIFICANT CHANGE UP (ref 3.8–5.2)
RBC # BLD: 4.61 M/UL — SIGNIFICANT CHANGE UP (ref 3.8–5.2)
RBC # FLD: 12.6 % — SIGNIFICANT CHANGE UP (ref 10.3–14.5)
RBC # FLD: 12.6 % — SIGNIFICANT CHANGE UP (ref 10.3–14.5)
SODIUM SERPL-SCNC: 139 MMOL/L — SIGNIFICANT CHANGE UP (ref 135–145)
SODIUM SERPL-SCNC: 139 MMOL/L — SIGNIFICANT CHANGE UP (ref 135–145)
TRIGL SERPL-MCNC: 139 MG/DL — SIGNIFICANT CHANGE UP
TRIGL SERPL-MCNC: 139 MG/DL — SIGNIFICANT CHANGE UP
TSH SERPL-MCNC: 4.1 UIU/ML — SIGNIFICANT CHANGE UP (ref 0.27–4.2)
TSH SERPL-MCNC: 4.1 UIU/ML — SIGNIFICANT CHANGE UP (ref 0.27–4.2)
WBC # BLD: 5.83 K/UL — SIGNIFICANT CHANGE UP (ref 3.8–10.5)
WBC # BLD: 5.83 K/UL — SIGNIFICANT CHANGE UP (ref 3.8–10.5)
WBC # FLD AUTO: 5.83 K/UL — SIGNIFICANT CHANGE UP (ref 3.8–10.5)
WBC # FLD AUTO: 5.83 K/UL — SIGNIFICANT CHANGE UP (ref 3.8–10.5)

## 2023-12-01 PROCEDURE — 93306 TTE W/DOPPLER COMPLETE: CPT | Mod: 26

## 2023-12-01 RX ORDER — ACETAMINOPHEN 500 MG
650 TABLET ORAL ONCE
Refills: 0 | Status: COMPLETED | OUTPATIENT
Start: 2023-12-01 | End: 2023-12-01

## 2023-12-01 RX ORDER — LANOLIN ALCOHOL/MO/W.PET/CERES
3 CREAM (GRAM) TOPICAL ONCE
Refills: 0 | Status: DISCONTINUED | OUTPATIENT
Start: 2023-12-01 | End: 2023-12-01

## 2023-12-01 RX ORDER — LISINOPRIL 2.5 MG/1
1 TABLET ORAL
Qty: 30 | Refills: 0
Start: 2023-12-01 | End: 2023-12-30

## 2023-12-01 RX ORDER — ATORVASTATIN CALCIUM 80 MG/1
1 TABLET, FILM COATED ORAL
Qty: 30 | Refills: 0
Start: 2023-12-01 | End: 2023-12-30

## 2023-12-01 RX ORDER — LANOLIN ALCOHOL/MO/W.PET/CERES
1 CREAM (GRAM) TOPICAL
Qty: 0 | Refills: 0 | DISCHARGE
Start: 2023-12-01

## 2023-12-01 RX ORDER — ATORVASTATIN CALCIUM 80 MG/1
0 TABLET, FILM COATED ORAL
Qty: 0 | Refills: 0 | DISCHARGE

## 2023-12-01 RX ORDER — ATENOLOL 25 MG/1
0 TABLET ORAL
Qty: 0 | Refills: 0 | DISCHARGE

## 2023-12-01 RX ORDER — ASPIRIN/CALCIUM CARB/MAGNESIUM 324 MG
1 TABLET ORAL
Qty: 0 | Refills: 0 | DISCHARGE
Start: 2023-12-01

## 2023-12-01 RX ADMIN — ATORVASTATIN CALCIUM 20 MILLIGRAM(S): 80 TABLET, FILM COATED ORAL at 00:40

## 2023-12-01 RX ADMIN — LISINOPRIL 5 MILLIGRAM(S): 2.5 TABLET ORAL at 05:51

## 2023-12-01 RX ADMIN — Medication 81 MILLIGRAM(S): at 11:45

## 2023-12-01 NOTE — CONSULT NOTE ADULT - SUBJECTIVE AND OBJECTIVE BOX
Cardiovascular Disease Initial Evaluation  Date of service: 23 @ 14:41    CHIEF COMPLAINT: HTN    HISTORY OF PRESENT ILLNESS:  49-year-old female with no past medical history who presents with an episode of chest pain, and persistent dizziness headache/neck pain.  Patient reports that she had an episode of left sided chest pain that radiates to her scapula and jaw around 3:30 AM today, lasted 1 to 2 minutes and resolved spontaneously.  She denied any shortness of breath or diaphoresis with the episode.  Since then has not had any chest pain.  She reports that she has a history of hypertension first documented about 3 years ago, she did a trial of a p.o. medication with atenolol but after she checked her pressures at home she stopped taking her medication.  She has not seen a doctor in the past 2 years.  When she began having these symptoms patient check her BP and found it to be elevated in the 180's systolic and 100s diastolic. Patient initially went to urgent care which cofirmed these elevated BP reading. Patient then came to the ER for further management. She currently denies any chest pain, shortness of breath, nausea, vomiting, changes in vision, abdominal pain.  She currently only endorses dizziness/headache/neck pain.      Allergies    No Known Allergies    Intolerances    	    MEDICATIONS:  aspirin  chewable 81 milliGRAM(s) Oral daily  lisinopril 5 milliGRAM(s) Oral daily        melatonin 3 milliGRAM(s) Oral once      atorvastatin 20 milliGRAM(s) Oral at bedtime        PAST MEDICAL & SURGICAL HISTORY:  GERD (Gastroesophageal Reflux Disease)      Miscarriage  X 3      Hypothyroid       (Normal Spontaneous Vaginal Delivery)        Placenta Previa  diagnosed  placed on bedrest, resolved      S/P Dilation and Curettage   Blighted Ovum  2010 Missed AB      TOP (Termination of Pregnancy)  2003       Section  , 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          FAMILY HISTORY:  No significant family history        SOCIAL HISTORY:    The patient is a nonsmoker       REVIEW OF SYSTEMS:  See HPI, otherwise complete 14 point review of systems negative    [x ] All others negative	  [ ] Unable to obtain    PHYSICAL EXAM:  T(C): 36.7 (23 @ 11:46), Max: 36.9 (23 @ 00:44)  HR: 76 (23 @ 11:46) (68 - 80)  BP: 155/78 (23 @ 11:46) (133/58 - 174/77)  RR: 16 (23 @ 11:46) (14 - 18)  SpO2: 100% (23 @ 11:46) (97% - 100%)  Wt(kg): --  I&O's Summary      Appearance: No Acute Distress; resting comfortably  HEENT:  Normal oral mucosa, PERRL, EOMI	  Cardiovascular: Normal S1 S2, No JVD, No murmurs/rubs/gallops  Respiratory: Normal respiratory effort; Lungs clear to auscultation bilaterally  Gastrointestinal:  Soft, Non-tender, + BS	  Skin: No rashes, No ecchymoses, No cyanosis	  Neurologic: Non-focal; no weakness  Extremities: No clubbing, cyanosis or edema  Vascular: Peripheral pulses palpable 2+ bilaterally  Psychiatry: A & O x 3, Mood & affect appropriate    Laboratory Data:	 	    CBC Full  -  ( 01 Dec 2023 07:16 )  WBC Count : 5.83 K/uL  Hemoglobin : 13.1 g/dL  Hematocrit : 38.9 %  Platelet Count - Automated : 236 K/uL  Mean Cell Volume : 84.4 fL  Mean Cell Hemoglobin : 28.4 pg  Mean Cell Hemoglobin Concentration : 33.7 gm/dL  Auto Neutrophil # : 2.76 K/uL  Auto Lymphocyte # : 2.47 K/uL  Auto Monocyte # : 0.46 K/uL  Auto Eosinophil # : 0.10 K/uL  Auto Basophil # : 0.03 K/uL  Auto Neutrophil % : 47.3 %  Auto Lymphocyte % : 42.4 %  Auto Monocyte % : 7.9 %  Auto Eosinophil % : 1.7 %  Auto Basophil % : 0.5 %        139  |  104  |  10  ----------------------------<  99  3.6   |  24  |  0.53      140  |  105  |  15  ----------------------------<  106<H>  4.4   |  28  |  0.60    Ca    9.6      01 Dec 2023 07:16  Ca    9.7      2023 11:24  Phos  3.6       Mg     2.00         TPro  7.5  /  Alb  4.5  /  TBili  0.6  /  DBili  x   /  AST  12  /  ALT  14  /  AlkPhos  64  -30      proBNP:   Lipid Profile:   HgA1c:   TSH: Thyroid Stimulating Hormone, Serum: 4.10 uIU/mL ( @ 07:16)        CARDIAC MARKERS: Trop T <6 x2            Interpretation of Telemetry: Sinus    ECG: Sinus rhythm  RADIOLOGY:  OTHER: 	    PREVIOUS DIAGNOSTIC TESTING:    [ ] Echocardiogram:  [ ] Catheterization:  [ ] Stress Test:  	    Assessment:  49-year-old female with no past medical history who presents with an episode of chest pain, and persistent dizziness headache/neck pain    Plan of Care:      #HTN urgency  - BP much improved with addition of Lisinopril 5mg, patient is now chest pain free   - Uncontrolled HTN likely cause of chest discomfort  - EKG shows sinus rhythm  - Continue ACE  - Echo pending    #Chest discomfort  - ACS ruled out  - Likely 2/2 uncontrolled HTN  - Patient would benefit from nuclear stress testing    #HLD  - ASCVD 3.8%  - Agree with moderate dose statin therapy      72 minutes spent on total encounter; more than 50% of the visit was spent counseling and/or coordinating care by the attending physician.   	  Anant Byrd DO [facility]  Cardiovascular Diseases  [phone]

## 2023-12-01 NOTE — DISCHARGE NOTE PROVIDER - HOSPITAL COURSE
49-year-old female with no past medical history who presents with an episode of chest pain, and persistent dizziness headache/neck pain.    Patient reports that she had an episode of left sided chest pain that radiates to her scapula and jaw around 3:30 AM today, lasted 1 to 2 minutes and resolved spontaneously.  She denied any shortness of breath or diaphoresis with the episode.  Since then has not had any chest pain.  She reports that she has a history of hypertension first documented about 3 years ago, she did a trial of a p.o. medication but after she checked her pressures at home she stopped taking her medication.  She has not seen a doctor in the past 2 years.  She currently denies any chest pain, shortness of breath, nausea, vomiting, changes in vision, abdominal pain.  She currently only endorses dizziness/headache/neck pain.    HTN urgency  - BP much improved with addition of Lisinopril 5mg, patient is now chest pain free   - Uncontrolled HTN likely cause of chest discomfort  - EKG shows sinus rhythm  - Continue ACE  - Echo performed and result are pending    #Chest discomfort  - ACS ruled out  - Likely 2/2 uncontrolled HTN  - Patient would benefit from nuclear stress testing    #HLD  - ASCVD 3.8%  - Agree with moderate dose statin therapy

## 2023-12-01 NOTE — DISCHARGE NOTE PROVIDER - NSDCFUADDINST_GEN_ALL_CORE_FT
Followup echo result.   Will need CT chest non contrast and CT of abd/ pelvis with contrast as outpatient.

## 2023-12-01 NOTE — DISCHARGE NOTE PROVIDER - NSDCCPCAREPLAN_GEN_ALL_CORE_FT
PRINCIPAL DISCHARGE DIAGNOSIS  Diagnosis: Chest pain  Assessment and Plan of Treatment: Followup echo results. Please follow up with your primary care physician regarding your hospitalization and take all medications prescribed.

## 2023-12-01 NOTE — DISCHARGE NOTE PROVIDER - PROVIDER RX CONTACT NUMBER
HEMODIALYSIS PRE-TREATMENT NOTE    Patient Identifiers prior to treatment: Name  MRN    Isolation Required: yes                      Isolation Type: Contact       (please document if patient is being managed as a PUI/COVID-19 patient)        Hepatitis status:                           Date Drawn                             Result  Hepatitis B Surface Antigen Unknown     Unknown                     Hepatitis B Surface Antibody N/A N/A        Hepatitis B Core Antibody N/A N/A          How was Hepatitis Status verified: [facility] Chart      Was a copy of the labs you documented provided to facility for the patient's chart: Yes    Hemodialysis orders verified: Yes by Lolly Hines    Access Within normal limits ( I.e. s/s of infection,...): WNL     Pre-Assessment completed: Yes by Lolly Hines    Pre-dialysis report received from: Nolvia                      Time: 0900 [phone]

## 2023-12-01 NOTE — DISCHARGE NOTE PROVIDER - NSDCMRMEDTOKEN_GEN_ALL_CORE_FT
aspirin 81 mg oral tablet, chewable: 1 tab(s) orally once a day  benzonatate 100 mg oral capsule: 1 cap(s) orally 3 times a day   Lipitor 20 mg oral tablet: 1 tab(s) orally once a day (at bedtime)  lisinopril 5 mg oral tablet: 1 tab(s) orally once a day  melatonin 3 mg oral tablet: 1 tab(s) orally once

## 2023-12-01 NOTE — DISCHARGE NOTE NURSING/CASE MANAGEMENT/SOCIAL WORK - PATIENT PORTAL LINK FT
You can access the FollowMyHealth Patient Portal offered by [facility] by registering at the following website: [email or web address]. By joining [facility]’s FollowMyHealth portal, you will also be able to view your health information using other applications (apps) compatible with our system.

## 2023-12-01 NOTE — DISCHARGE NOTE PROVIDER - PROVIDER TOKENS
PROVIDER:[TOKEN:[[id number]],FOLLOWUP:[1 week],ESTABLISHEDPATIENT:[T]],PROVIDER:[TOKEN:[[id number]],FOLLOWUP:[1 week]]

## 2024-01-04 ENCOUNTER — APPOINTMENT (OUTPATIENT)
Dept: PLASTIC SURGERY | Facility: CLINIC | Age: 54
End: 2024-01-04
Payer: SELF-PAY

## 2024-01-04 NOTE — HISTORY OF PRESENT ILLNESS
[FreeTextEntry1] : Pt here for botox injections Last received botox: 10-19-23 No adverse reactions, allergy or contraindications to procedure/botox. Patient noting minimal results of forehead significant residual movement after less than 1 to 2 months

## 2024-04-04 ENCOUNTER — APPOINTMENT (OUTPATIENT)
Dept: PLASTIC SURGERY | Facility: CLINIC | Age: 54
End: 2024-04-04
Payer: SELF-PAY

## 2024-04-30 ENCOUNTER — APPOINTMENT (OUTPATIENT)
Dept: PLASTIC SURGERY | Facility: CLINIC | Age: 54
End: 2024-04-30
Payer: SELF-PAY

## 2024-06-27 ENCOUNTER — APPOINTMENT (OUTPATIENT)
Dept: PLASTIC SURGERY | Facility: CLINIC | Age: 54
End: 2024-06-27
Payer: SELF-PAY

## 2024-06-27 DIAGNOSIS — Z41.1 ENCOUNTER FOR COSMETIC SURGERY: ICD-10-CM

## 2024-06-27 DIAGNOSIS — R23.8 OTHER SKIN CHANGES: ICD-10-CM

## 2024-07-08 NOTE — ED PROVIDER NOTE - GASTROINTESTINAL NEGATIVE STATEMENT, MLM
Thank you for choosing [facility] Emergency Department. It was my pleasure to be involved in your care today.         As of today's visit, based on reasonable likelihood, that it is safe for you to be discharged back to your residence to follow-up as an outpatient for ongoing management of your medical problem. You should follow-up with any referrals / primary provider as soon as possible. The contacts (number, addresses) are listed below.         *Return to us immediately, if you feel you are getting worse, not getting better, or any new symptoms develop.         Make sure your pharmacy and primary doctor is aware of any new medications prescribed today.          It is your responsibility to contact as soon as possible, and follow through with, any referrals you were given today. We do recommend you inform them you are a [facility] follow-up patient, as often they can better accommodate your need to be seen, provided their schedules allow. We will, and have, made every effort to ensure you have access to adequate follow-up specialists available.          All problems may not be able to be fixed in one ER visit. This is why timely ongoing care is important, and this is a responsibility you share in. Further, you are free to follow up with any provider you choose, and this is not limited to our suggestion.          If cultures were obtained today, you will be contacted should anything result that would require further treatment. Please contact the ED at the number provided with questions.          Having trouble affording medications? Try [email or web address]! (This is not a hospital endorsed website, merely a recommendation based on my own personal experiences with [employer])   no abdominal pain, no bloating, no constipation, no diarrhea, no nausea and no vomiting.

## 2024-08-28 ENCOUNTER — APPOINTMENT (OUTPATIENT)
Dept: PLASTIC SURGERY | Facility: CLINIC | Age: 54
End: 2024-08-28
Payer: COMMERCIAL

## 2024-08-28 DIAGNOSIS — Z41.1 ENCOUNTER FOR COSMETIC SURGERY: ICD-10-CM

## 2024-08-28 PROCEDURE — 99024 POSTOP FOLLOW-UP VISIT: CPT

## 2024-09-04 NOTE — HISTORY OF PRESENT ILLNESS
[FreeTextEntry1] : Pt here for botox injections Last received botox: 6/27/2024 No adverse reactions, allergy or contraindications to procedure/botox. Patient feels that her eyebrows are still drooping since the last Botox injections she feels that she has lost her natural shape

## 2024-10-01 ENCOUNTER — APPOINTMENT (OUTPATIENT)
Dept: PLASTIC SURGERY | Facility: CLINIC | Age: 54
End: 2024-10-01
Payer: SELF-PAY

## 2024-10-01 DIAGNOSIS — Z41.1 ENCOUNTER FOR COSMETIC SURGERY: ICD-10-CM

## 2025-01-21 ENCOUNTER — APPOINTMENT (OUTPATIENT)
Dept: PLASTIC SURGERY | Facility: CLINIC | Age: 55
End: 2025-01-21
Payer: SELF-PAY

## 2025-01-21 DIAGNOSIS — Z41.1 ENCOUNTER FOR COSMETIC SURGERY: ICD-10-CM

## 2025-04-17 ENCOUNTER — APPOINTMENT (OUTPATIENT)
Dept: PLASTIC SURGERY | Facility: CLINIC | Age: 55
End: 2025-04-17
Payer: SELF-PAY

## 2025-04-17 DIAGNOSIS — Z41.1 ENCOUNTER FOR COSMETIC SURGERY: ICD-10-CM

## 2025-06-08 NOTE — ED PROVIDER NOTE - NSICDXPASTSURGICALHX_GEN_ALL_CORE_FT
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

## 2025-07-31 ENCOUNTER — APPOINTMENT (OUTPATIENT)
Dept: PLASTIC SURGERY | Facility: CLINIC | Age: 55
End: 2025-07-31
Payer: SELF-PAY

## 2025-07-31 DIAGNOSIS — Z41.1 ENCOUNTER FOR COSMETIC SURGERY: ICD-10-CM
